# Patient Record
Sex: FEMALE | Race: WHITE | Employment: STUDENT | URBAN - METROPOLITAN AREA
[De-identification: names, ages, dates, MRNs, and addresses within clinical notes are randomized per-mention and may not be internally consistent; named-entity substitution may affect disease eponyms.]

---
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
## 2024-07-15 ENCOUNTER — HOSPITAL ENCOUNTER (EMERGENCY)
Age: 19
Discharge: HOME | End: 2024-07-15
Payer: MEDICAID

## 2024-07-15 ENCOUNTER — APPOINTMENT (OUTPATIENT)
Dept: GENERAL RADIOLOGY | Age: 19
End: 2024-07-15
Payer: MEDICAID

## 2024-07-15 VITALS
WEIGHT: 147 LBS | HEART RATE: 90 BPM | SYSTOLIC BLOOD PRESSURE: 115 MMHG | DIASTOLIC BLOOD PRESSURE: 66 MMHG | HEIGHT: 65 IN | BODY MASS INDEX: 24.49 KG/M2 | OXYGEN SATURATION: 98 % | TEMPERATURE: 99 F | RESPIRATION RATE: 16 BRPM

## 2024-07-15 DIAGNOSIS — M25.561 ACUTE PAIN OF RIGHT KNEE: Primary | ICD-10-CM

## 2024-07-15 PROCEDURE — 99283 EMERGENCY DEPT VISIT LOW MDM: CPT

## 2024-07-15 PROCEDURE — 99284 EMERGENCY DEPT VISIT MOD MDM: CPT

## 2024-07-15 PROCEDURE — 73560 X-RAY EXAM OF KNEE 1 OR 2: CPT

## 2024-07-15 RX ORDER — DOXYCYCLINE 100 MG/1
CAPSULE ORAL
COMMUNITY
Start: 2023-11-29

## 2024-07-16 NOTE — DISCHARGE INSTRUCTIONS
Ace wrap or knee sleeve to the right knee.  Over-the-counter Tylenol/Motrin as needed for pain.  Call Ortho in 2 days to arrange a follow-up appointment.

## 2024-07-16 NOTE — ED PROVIDER NOTES
Patient Seen in: [facility] In [place]      History     Chief Complaint   Patient presents with    Leg or Foot Injury    Pain     Stated Complaint: Right knee pain    Subjective:   18-year-old female, who presents with pain and swelling to the inner right knee region for the last 2 to 3 weeks.  Denies injury or trauma.  Denies chance of pregnancy.            Objective:   History reviewed. No pertinent past medical history.           History reviewed. No pertinent surgical history.             No pertinent social history.            Review of Systems   Constitutional: Negative.    Musculoskeletal:         Right knee pain and swelling   All other systems reviewed and are negative.      Positive for stated Chief Complaint: Leg or Foot Injury and Pain    Other systems are as noted in HPI.  Constitutional and vital signs reviewed.      All other systems reviewed and negative except as noted above.    Physical Exam     ED Triage Vitals [07/15/24 1836]   /66   Pulse 90   Resp 16   Temp 98.6 °F (37 °C)   Temp src Temporal   SpO2 98 %   O2 Device None (Room air)       Current Vitals:   Vital Signs  BP: 115/66  Pulse: 90  Resp: 16  Temp: 98.6 °F (37 °C)  Temp src: Temporal    Oxygen Therapy  SpO2: 98 %  O2 Device: None (Room air)            Physical Exam  Vitals and nursing note reviewed.   Constitutional:       Appearance: She is not ill-appearing, toxic-appearing or diaphoretic.   Pulmonary:      Effort: No respiratory distress.   Musculoskeletal:      Right knee: Swelling present. No deformity or bony tenderness. Tenderness present over the medial joint line. Normal pulse.      Right lower leg: Normal.   Skin:     Coloration: Skin is not pale.   Neurological:      General: No focal deficit present.      Mental Status: She is alert.               ED Course   Labs Reviewed - No data to display  XR KNEE (1 OR 2 VIEWS), RIGHT (CPT=73560)    Result Date: 7/15/2024  PROCEDURE:  XR KNEE (1 OR 2 VIEWS),  RIGHT (CPT=73560)  COMPARISON:  None.  INDICATIONS:  Right knee pain  PATIENT STATED HISTORY: (As transcribed by Technologist)  Patient states that she has right knee pain for 3 weeks and feels like a \"moveable ball\" is on the medial aspect of her right knee.    FINDINGS:  No evidence of acute displaced fracture or dislocation. Normal mineralization. Unremarkable soft tissues.            CONCLUSION:  No evidence of acute displaced fracture or dislocation.   LOCATION:  [id number]   Dictated by (CST): Stromberg, LeRoy, MD on 7/15/2024 at 8:14 PM     Finalized by (CST): Stromberg, LeRoy, MD on 7/15/2024 at 8:15 PM                       [facility]                                         Medical Decision Making  Differential diagnosis initially included but was not limited to: Knee fracture, knee sprain    18-year-old female, with swelling and tenderness along the medial aspect of the right knee.  No neurovascular deficits.  Some pain with range of motion.  X-ray negative.  Ace wrap.  Follow-up with Ortho.  OTC Tylenol/Motrin as needed for pain.  I personally viewed, independently interpreted and radiology report was reviewed.    Supportive/home management of diagnosis/illness/injury discussed. Red flag symptoms discussed.  Signs and symptoms/criteria that would necessitate reevaluation, including ER evaluation discussed.  Patient and/or responsible adult verbalize and agree with management and plan of care.    Speech recognition software was used during this dictation.  There may be minor errors in transcription.      Amount and/or Complexity of Data Reviewed  Radiology: ordered and independent interpretation performed. Decision-making details documented in ED Course.    Risk  OTC drugs.        Disposition and Plan     Clinical Impression:  1. Acute pain of right knee         Disposition:  Discharge  7/15/2024  8:46 pm    Follow-up:  Manny Catsaneda PA  [address] [place] [address] [id number] [phone]    Call in 2  day(s)            Medications Prescribed:  Current Discharge Medication List

## 2024-11-20 ENCOUNTER — APPOINTMENT (OUTPATIENT)
Dept: DERMATOLOGY | Age: 19
End: 2024-11-20

## 2024-11-25 RX ORDER — HYDROQUINONE 40 MG/G
CREAM TOPICAL
Qty: 30 G | Refills: 3 | Status: SHIPPED | OUTPATIENT
Start: 2024-11-25

## 2025-01-22 ENCOUNTER — APPOINTMENT (OUTPATIENT)
Dept: DERMATOLOGY | Age: 20
End: 2025-01-22

## 2025-02-09 ENCOUNTER — HOSPITAL ENCOUNTER (EMERGENCY)
Age: 20
Discharge: HOME | End: 2025-02-09
Attending: STUDENT IN AN ORGANIZED HEALTH CARE EDUCATION/TRAINING PROGRAM
Payer: MEDICAID

## 2025-02-09 VITALS
OXYGEN SATURATION: 99 % | HEART RATE: 67 BPM | DIASTOLIC BLOOD PRESSURE: 81 MMHG | BODY MASS INDEX: 23.88 KG/M2 | RESPIRATION RATE: 18 BRPM | WEIGHT: 143.31 LBS | HEIGHT: 65 IN | TEMPERATURE: 98 F | SYSTOLIC BLOOD PRESSURE: 111 MMHG

## 2025-02-09 DIAGNOSIS — T23.241A PARTIAL THICKNESS BURN OF MULTIPLE DIGITS OF RIGHT HAND INCLUDING PARTIAL THICKNESS BURN OF THUMB, INITIAL ENCOUNTER: Primary | ICD-10-CM

## 2025-02-09 PROCEDURE — 99284 EMERGENCY DEPT VISIT MOD MDM: CPT

## 2025-02-09 PROCEDURE — 99283 EMERGENCY DEPT VISIT LOW MDM: CPT

## 2025-02-09 RX ORDER — HYDROCODONE BITARTRATE AND ACETAMINOPHEN 5; 325 MG/1; MG/1
1-2 TABLET ORAL EVERY 6 HOURS PRN
Qty: 10 TABLET | Refills: 0 | Status: SHIPPED | OUTPATIENT
Start: 2025-02-09 | End: 2025-02-14

## 2025-02-09 RX ORDER — IBUPROFEN 600 MG/1
600 TABLET, FILM COATED ORAL EVERY 8 HOURS PRN
Qty: 30 TABLET | Refills: 0 | Status: SHIPPED | OUTPATIENT
Start: 2025-02-09 | End: 2025-02-16

## 2025-02-09 RX ORDER — IBUPROFEN 600 MG/1
600 TABLET, FILM COATED ORAL ONCE
Status: COMPLETED | OUTPATIENT
Start: 2025-02-09 | End: 2025-02-09

## 2025-02-09 RX ORDER — HYDROCODONE BITARTRATE AND ACETAMINOPHEN 5; 325 MG/1; MG/1
1 TABLET ORAL ONCE
Status: COMPLETED | OUTPATIENT
Start: 2025-02-09 | End: 2025-02-09

## 2025-02-09 RX ORDER — SILVER SULFADIAZINE 10 MG/G
CREAM TOPICAL ONCE
Status: COMPLETED | OUTPATIENT
Start: 2025-02-09 | End: 2025-02-09

## 2025-02-09 NOTE — ED QUICK NOTES
The site was cleansed with NS and the egg and flour were removed from the wounds. Blistering is now noted to the webspace of the right thumb. Silvadene cream and a bulky 4x4 dressing were applied. The pt was medicated as ordered for pain earlier.
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
## 2025-03-05 ENCOUNTER — APPOINTMENT (OUTPATIENT)
Dept: DERMATOLOGY | Age: 20
End: 2025-03-05

## 2025-04-16 ENCOUNTER — APPOINTMENT (OUTPATIENT)
Dept: DERMATOLOGY | Age: 20
End: 2025-04-16

## 2025-04-16 DIAGNOSIS — L70.9 ACNE, UNSPECIFIED ACNE TYPE: Primary | ICD-10-CM

## 2025-04-16 PROCEDURE — 99213 OFFICE O/P EST LOW 20 MIN: CPT | Performed by: DERMATOLOGY

## 2025-04-16 RX ORDER — CEFUROXIME AXETIL 250 MG/1
TABLET ORAL
Qty: 120 TABLET | Refills: 1 | Status: SHIPPED | OUTPATIENT
Start: 2025-04-16

## 2025-04-16 RX ORDER — TRETINOIN 0.25 MG/G
CREAM TOPICAL NIGHTLY
Qty: 90 G | Refills: 3 | Status: SHIPPED | OUTPATIENT
Start: 2025-04-16